# Patient Record
Sex: MALE | Race: WHITE | NOT HISPANIC OR LATINO | Employment: STUDENT | URBAN - METROPOLITAN AREA
[De-identification: names, ages, dates, MRNs, and addresses within clinical notes are randomized per-mention and may not be internally consistent; named-entity substitution may affect disease eponyms.]

---

## 2020-04-30 ENCOUNTER — HOSPITAL ENCOUNTER (EMERGENCY)
Facility: CLINIC | Age: 25
Discharge: HOME | End: 2020-04-30
Attending: FAMILY MEDICINE | Admitting: FAMILY MEDICINE
Payer: COMMERCIAL

## 2020-04-30 ENCOUNTER — APPOINTMENT (OUTPATIENT)
Dept: GENERAL RADIOLOGY | Facility: CLINIC | Age: 25
End: 2020-04-30
Attending: FAMILY MEDICINE
Payer: COMMERCIAL

## 2020-04-30 VITALS
TEMPERATURE: 98.7 F | OXYGEN SATURATION: 97 % | DIASTOLIC BLOOD PRESSURE: 85 MMHG | WEIGHT: 190 LBS | SYSTOLIC BLOOD PRESSURE: 132 MMHG | RESPIRATION RATE: 16 BRPM | BODY MASS INDEX: 25.73 KG/M2 | HEIGHT: 72 IN

## 2020-04-30 DIAGNOSIS — S93.411A SPRAIN OF CALCANEOFIBULAR LIGAMENT OF RIGHT ANKLE, INITIAL ENCOUNTER: ICD-10-CM

## 2020-04-30 PROCEDURE — 99283 EMERGENCY DEPT VISIT LOW MDM: CPT

## 2020-04-30 PROCEDURE — 73610 X-RAY EXAM OF ANKLE: CPT | Mod: RT

## 2020-04-30 PROCEDURE — 99282 EMERGENCY DEPT VISIT SF MDM: CPT | Mod: Z6 | Performed by: FAMILY MEDICINE

## 2020-04-30 PROCEDURE — 25000132 ZZH RX MED GY IP 250 OP 250 PS 637: Performed by: FAMILY MEDICINE

## 2020-04-30 RX ORDER — IBUPROFEN 200 MG
200 TABLET ORAL ONCE
Status: COMPLETED | OUTPATIENT
Start: 2020-04-30 | End: 2020-04-30

## 2020-04-30 RX ORDER — ACETAMINOPHEN 500 MG
1000 TABLET ORAL ONCE
Status: COMPLETED | OUTPATIENT
Start: 2020-04-30 | End: 2020-04-30

## 2020-04-30 RX ADMIN — ACETAMINOPHEN 1000 MG: 500 TABLET, FILM COATED ORAL at 10:33

## 2020-04-30 RX ADMIN — IBUPROFEN 200 MG: 200 TABLET, FILM COATED ORAL at 10:33

## 2020-04-30 ASSESSMENT — MIFFLIN-ST. JEOR: SCORE: 1889.83

## 2020-04-30 NOTE — ED AVS SNAPSHOT
[facility] Emergency Department  [address] [address]  Phone:  [phone]  Fax:  [phone]                                    Demario Rodrigez   MRN: [id number]    Department:  [facility] Emergency Department   Date of Visit:  4/30/2020           After Visit Summary Signature Page    I have received my discharge instructions, and my questions have been answered. I have discussed any challenges I see with this plan with the nurse or doctor.    ..........................................................................................................................................  Patient/Patient Representative Signature      ..........................................................................................................................................  Patient Representative Print Name and Relationship to Patient    ..................................................               ................................................  Date                                   Time    ..........................................................................................................................................  Reviewed by Signature/Title    ...................................................              ..............................................  Date                                               Time          22EPIC Rev 08/18

## 2020-04-30 NOTE — DISCHARGE INSTRUCTIONS
ICD-10-CM    1. Sprain of calcaneofibular ligament of right ankle, initial encounter  S93.411A Ankle Stabilizer Brace Regular (Gel Splint)    keep splinted with air splint, crutch walking.  return for numb, cold, immobile foot./ RICE-M.   slow rehab back to activity and then work proprioception as we discussed.     alternate ibu 600 mg every 6 hours and tylenol 1000 every 6 hours

## 2020-04-30 NOTE — ED PROVIDER NOTES
History     Chief Complaint   Patient presents with     Ankle Pain     fell playing basketball yesterday, right ankle pain; tried rest ice elevation without improvement     HPI  Demario Rodrigez is a 24 year old male who presents with ankle sprain last evening while playing basketball.  inversion injuryb  RT ankle.  with pain at lateral and medial ankle and unable to ambiulate.  has crutches.  No other injuries  prior ankle injury during boot camp  in 2013, and again in 2015.    Allergies:  No Known Allergies    Problem List:    There are no active problems to display for this patient.       Past Medical History:    No past medical history on file.    Past Surgical History:    No past surgical history on file.    Family History:    No family history on file.    Social History:  Marital Status:  Single [1]  Social History     Tobacco Use     Smoking status: Not on file   Substance Use Topics     Alcohol use: Not on file     Drug use: Not on file        Medications:    No current outpatient medications on file.        Review of Systems  ROS:  5 point ROS negative except as noted above in HPI, including Gen., Resp., CV, GI &  system review.    Physical Exam   BP: 132/85  Heart Rate: 82  Temp: 98.7  F (37.1  C)  Resp: 16  Height: 182.9 cm (6')  Weight: 86.2 kg (190 lb)  SpO2: 97 %      Physical Exam  Constitutional:       General: He is in acute distress.   Musculoskeletal:      Right knee: He exhibits normal range of motion, no swelling, no effusion and no bony tenderness.      Right ankle: He exhibits decreased range of motion and swelling. He exhibits no ecchymosis, no deformity, no laceration and normal pulse. Tenderness. Lateral malleolus, medial malleolus, AITFL and CF ligament tenderness found. No posterior TFL, no head of 5th metatarsal and no proximal fibula tenderness found.      Right foot: Normal capillary refill. No tenderness or bony tenderness.   Neurological:      Mental Status: He is alert.          ED Course        Procedures               Critical Care time:  none               Results for orders placed or performed during the hospital encounter of 04/30/20 (from the past 24 hour(s))   Ankle XR, G/E 3 views, right    Narrative    XR ANKLE RT G/E 3 VW 4/30/2020 10:27 AM     HISTORY: ankle msprain medial and lateral ankle swelling, tendernss      Impression    IMPRESSION: Lateral soft tissue swelling. Ligament injury is  suspected. No apparent fracture. The ankle mortise appears congruent.    FANY LEWIS MD       Medications - No data to display    Assessments & Plan (with Medical Decision Making)     MFDM: Demario Rodrigez is a 24 year old male presents with ankle sprain and lateral right ankle likely CF ligaments with significant swelling lateral ankle more than medial.  Decreased range of motion and difficulty ambulation, bony tenderness led to x-ray which demonstrates no obvious fracture.  Discussed the use of gel splint crutch walking gradual rehab and proprioception exercises with follow-up with primary doctor in a week and consider re-x-ray.  Discussed pain management with alternating Tylenol and Motrin.  Precautions given for return.  I have reviewed the nursing notes.    I have reviewed the findings, diagnosis, plan and need for follow up with the patient.       New Prescriptions    No medications on file       Final diagnoses:   Sprain of calcaneofibular ligament of right ankle, initial encounter - keep splinted with air splint, crutch walking.  return for numb, cold, immobile foot./ RICE-M.   slow rehab back to activity and then work proprioception as we discussed.       4/30/2020   [facility] EMERGENCY DEPARTMENT     Chiki Neal MD  04/30/20 [id number]

## 2021-06-13 ENCOUNTER — NURSE TRIAGE (OUTPATIENT)
Dept: NURSING | Facility: CLINIC | Age: 26
End: 2021-06-13

## 2021-06-13 ENCOUNTER — HOSPITAL ENCOUNTER (EMERGENCY)
Facility: CLINIC | Age: 26
Discharge: HOME | End: 2021-06-13
Attending: EMERGENCY MEDICINE | Admitting: EMERGENCY MEDICINE
Payer: COMMERCIAL

## 2021-06-13 VITALS
RESPIRATION RATE: 14 BRPM | BODY MASS INDEX: 27.09 KG/M2 | WEIGHT: 200 LBS | HEIGHT: 72 IN | OXYGEN SATURATION: 99 % | SYSTOLIC BLOOD PRESSURE: 108 MMHG | DIASTOLIC BLOOD PRESSURE: 74 MMHG | HEART RATE: 86 BPM | TEMPERATURE: 97.8 F

## 2021-06-13 DIAGNOSIS — T78.40XA ALLERGIC REACTION, INITIAL ENCOUNTER: ICD-10-CM

## 2021-06-13 PROBLEM — M75.40 IMPINGEMENT SYNDROME OF SHOULDER REGION: Status: ACTIVE | Noted: 2021-06-13

## 2021-06-13 PROBLEM — M54.50 LOW BACK PAIN: Status: ACTIVE | Noted: 2021-06-13

## 2021-06-13 PROBLEM — T14.90XA TRAUMATIC INJURY: Status: ACTIVE | Noted: 2021-06-13

## 2021-06-13 PROBLEM — T23.261A SECOND DEGREE BURN OF BACK OF RIGHT HAND: Status: ACTIVE | Noted: 2019-06-07

## 2021-06-13 PROBLEM — R51.9 HEADACHE: Status: ACTIVE | Noted: 2021-06-13

## 2021-06-13 PROCEDURE — 96361 HYDRATE IV INFUSION ADD-ON: CPT | Performed by: EMERGENCY MEDICINE

## 2021-06-13 PROCEDURE — 96365 THER/PROPH/DIAG IV INF INIT: CPT | Performed by: EMERGENCY MEDICINE

## 2021-06-13 PROCEDURE — 96375 TX/PRO/DX INJ NEW DRUG ADDON: CPT | Performed by: EMERGENCY MEDICINE

## 2021-06-13 PROCEDURE — 250N000011 HC RX IP 250 OP 636: Performed by: EMERGENCY MEDICINE

## 2021-06-13 PROCEDURE — 96366 THER/PROPH/DIAG IV INF ADDON: CPT | Performed by: EMERGENCY MEDICINE

## 2021-06-13 PROCEDURE — 258N000003 HC RX IP 258 OP 636: Performed by: EMERGENCY MEDICINE

## 2021-06-13 PROCEDURE — 99284 EMERGENCY DEPT VISIT MOD MDM: CPT | Mod: 25 | Performed by: EMERGENCY MEDICINE

## 2021-06-13 PROCEDURE — 99284 EMERGENCY DEPT VISIT MOD MDM: CPT | Performed by: EMERGENCY MEDICINE

## 2021-06-13 RX ORDER — ONDANSETRON 2 MG/ML
4 INJECTION INTRAMUSCULAR; INTRAVENOUS EVERY 30 MIN PRN
Status: DISCONTINUED | OUTPATIENT
Start: 2021-06-13 | End: 2021-06-13 | Stop reason: HOSPADM

## 2021-06-13 RX ORDER — METHYLPREDNISOLONE SODIUM SUCCINATE 125 MG/2ML
125 INJECTION, POWDER, LYOPHILIZED, FOR SOLUTION INTRAMUSCULAR; INTRAVENOUS ONCE
Status: COMPLETED | OUTPATIENT
Start: 2021-06-13 | End: 2021-06-13

## 2021-06-13 RX ORDER — EPINEPHRINE 0.3 MG/.3ML
0.3 INJECTION SUBCUTANEOUS
Qty: 1 EACH | Refills: 0 | Status: SHIPPED | OUTPATIENT
Start: 2021-06-13

## 2021-06-13 RX ADMIN — ONDANSETRON 4 MG: 2 INJECTION INTRAMUSCULAR; INTRAVENOUS at 11:35

## 2021-06-13 RX ADMIN — FAMOTIDINE 20 MG: 20 INJECTION, SOLUTION INTRAVENOUS at 13:16

## 2021-06-13 RX ADMIN — SODIUM CHLORIDE 1000 ML: 9 INJECTION, SOLUTION INTRAVENOUS at 11:35

## 2021-06-13 RX ADMIN — METHYLPREDNISOLONE SODIUM SUCCINATE 125 MG: 125 INJECTION, POWDER, FOR SOLUTION INTRAMUSCULAR; INTRAVENOUS at 13:15

## 2021-06-13 ASSESSMENT — ENCOUNTER SYMPTOMS
DIARRHEA: 1
CHILLS: 0
HEADACHES: 0
ABDOMINAL PAIN: 1
WEAKNESS: 1
FACIAL SWELLING: 0
VOMITING: 1
NERVOUS/ANXIOUS: 0
FEVER: 0
DIFFICULTY URINATING: 0
SORE THROAT: 0
VOICE CHANGE: 0
TROUBLE SWALLOWING: 0
LIGHT-HEADEDNESS: 1
MYALGIAS: 0
NAUSEA: 1
SHORTNESS OF BREATH: 0

## 2021-06-13 ASSESSMENT — MIFFLIN-ST. JEOR: SCORE: 1925.19

## 2021-06-13 NOTE — ED PROVIDER NOTES
History     Chief Complaint   Patient presents with     Allergic Reaction     Pt reports woke up this morning feeling fine, started to get itchy, took a shower, noted hives on legs and testicles, pt reports after his shower he went upstairs, reports he was so weak he fell going up the stairs. Pt denies CP, sob at this time. Pt reorts he did have an episode of vomting X2 with some diarrhea.      Abdominal Pain     Pt reports mid to lower abdominal pain, rating pain 4/10     HPI  Demario Rodrigez is a 26 year old male with h no reported past medical history who was brought in by ambulance with concern for allergic reaction.  He was in his usual state of health this morning and was outside playing with his children.  He went inside to take a shower and then noticed he had hives on his medial thighs bilaterally.  Area was red, raised and itchy.  He also noticed these in his axilla and a few on his legs.  He went to show his wife.  He started to feel weak.  He spoke to a nurse line and was vies to take Benadryl.  He tried to walk upstairs and just felt too weak and then went down to the floor.  His wife called 911.  He had 4 episodes of vomiting prior to arrival in the emergency department.  He was given 50 mg of diphenhydramine prehospital.  While in this department he has had multiple episodes of loose watery stools.  Has not had any difficulty breathing, swelling in mouth of throat, or wheezing.  No known allergies.  Not currently taking any medications.  No known exposures.    The patient's PMHx, Surgical Hx, Allergies, and Medications were all reviewed with the patient.    Allergies:  No Known Allergies    Problem List:    Patient Active Problem List    Diagnosis Date Noted     Traumatic injury 06/13/2021     Priority: Medium     Low back pain 06/13/2021     Priority: Medium     Impingement syndrome of shoulder region 06/13/2021     Priority: Medium     Aug 02, 2018 Entered By: ANTOINETTE WARREN Comment: right  side       Headache 06/13/2021     Priority: Medium     Second degree burn of back of right hand 06/07/2019     Priority: Medium        Past Medical History:    No past medical history on file.    Past Surgical History:    No past surgical history on file.    Family History:    No family history on file.    Social History:  Marital Status:   [2]  Social History     Tobacco Use     Smoking status: Not on file   Substance Use Topics     Alcohol use: Not on file     Drug use: Not on file        Medications:    EPINEPHrine (ANY BX GENERIC EQUIV) 0.3 MG/0.3ML injection 2-pack          Review of Systems   Constitutional: Negative for chills and fever.   HENT: Negative for facial swelling, mouth sores, sore throat, trouble swallowing and voice change.    Eyes: Negative for visual disturbance.   Respiratory: Negative for shortness of breath.    Cardiovascular: Negative for chest pain.   Gastrointestinal: Positive for abdominal pain, diarrhea, nausea and vomiting.   Genitourinary: Negative for difficulty urinating.   Musculoskeletal: Negative for myalgias.   Skin: Positive for rash (urticaria).   Neurological: Positive for weakness and light-headedness. Negative for headaches.   Psychiatric/Behavioral: The patient is not nervous/anxious.        Physical Exam   BP: (!) 116/90  Pulse: 59  Temp: 97.8  F (36.6  C)  Resp: 16  Height: 182.9 cm (6')  Weight: 90.7 kg (200 lb)  SpO2: 98 %    Physical Exam  GEN: Awake, alert, and cooperative.  Appears distressed but nontoxic.  HENT: MMM. External ears and nose normal bilaterally.  No perioral edema.  No lingual edema.  No edema posterior oropharynx.  No lesions appreciated in mouth.  EYES: EOM intact. Conjunctiva clear. No discharge.   NECK: Symmetric, freely mobile.  Nontender.  CV : Regular rate and rhythm.  Extremities warm and well perfused.  PULM: Normal effort. No wheezes, rales, or rhonchi bilaterally.  Good air movement bilaterally.  No prolongation of expiratory  phase.  ABD: Soft, mild generalized tenderness, non-distended. No rebound or guarding.   NEURO: Normal speech.  No change in voice following commands. CN II-XII grossly intact. Answering questions and interacting appropriately.   EXT: No gross deformity. Warm and well perfused.  INT: Scattered erythematous lesions.  Some raised some flat which are pruritic in perineum.        ED Course        Procedures           Critical Care time:  none               No results found for this or any previous visit (from the past 24 hour(s)).    Medications   ondansetron (ZOFRAN) injection 4 mg (4 mg Intravenous Given 6/13/21 1135)   famotidine (PEPCID) infusion 20 mg (0 mg Intravenous Stopped 6/13/21 1447)   0.9% sodium chloride BOLUS (0 mLs Intravenous Stopped 6/13/21 1316)   methylPREDNISolone sodium succinate (solu-MEDROL) injection 125 mg (125 mg Intravenous Given 6/13/21 1315)       Assessments & Plan (with Medical Decision Making)   26 year old male with no reported past medical history was brought in by ambulance with urticarial rash associated with abdominal cramping, nausea, vomiting, diarrhea, and weakness.  He was [id number] of IV fluids and 50 mg of diphenhydramine prehospital.     On arrival to emergency department heart rate was 59, SPO2 98% on room air, respiratory rate 16, afebrile, and blood pressure 116/90.  On my initial evaluation, he did not appear acutely distressed.  Was breathing comfortably with no accessory muscle usage.  No wheezing or prolongation of expiratory phase.  No edema of posterior oropharynx.  No stridor.  He did have signs of resolving urticaria in his proximal arms and legs which were fairly symmetric.  He had a few loose stools while the emergency department and his presentation is consistent with anaphylaxis.  It had been several hours since onset of his symptoms prior to my evaluation in the emergency department.  Given no respiratory involvement, hypotension, and improving GI symptoms I did  not feel that administration of epinephrine needed at this time.  He received diphenhydramine prehospital and he was given famotidine and methylprednisolone while in the emergency department.  He was observed for several hours and continued to improve symptomatically.    No clear precipitant of today's event.  States only thing he ate this morning was with watermelon which she is eaten before without difficulty.  He was inquiring about following up in clinic to determine what he may be allergic to.  I suggest that he follows up with primary care provider and may need to be evaluated by an allergist.  Meanwhile he was given a prescription for an EpiPen and instructed on when and how to use.  [facility] with his normal providers there.  Given his continued improvement of symptoms and no airway involvement, I feel he is safe for discharge.  Strict ED return precautions discussed.  He expressed agreement understanding of plan and discharged in improved condition.    I have reviewed the nursing notes.         Discharge Medication List as of 6/13/2021  2:55 PM      START taking these medications    Details   EPINEPHrine (ANY BX GENERIC EQUIV) 0.3 MG/0.3ML injection 2-pack Inject 0.3 mLs (0.3 mg) into the muscle once as needed for anaphylaxis, Disp-1 each, R-0, E-Prescribe             Final diagnoses:   Allergic reaction, initial encounter     Arsenio Ron MD    6/13/2021   [facility] EMERGENCY DEPT    Disclaimer: This note consists of words and symbols derived from keyboarding and dictation using voice recognition software.  As a result, there may be errors that have gone undetected.  Please consider this when interpreting information found in this note.             Arsenio Ron MD  06/17/21 [id number]

## 2021-06-13 NOTE — TELEPHONE ENCOUNTER
Hives noticed noticed  Itchy  No out of the ordinary food    Stated he is getting lightheaded  Went to get Benadryl - fell, knocked over the gate, fell down two steps    Wife came on phone and said she was going to call 911     I called back:  Demario was on the floor  Breathing ok  Responsive  Stomach hurts  Began to vomit  I stayed on the phone with her.  First responders arrived  I waited on the phone for several minutes as I'd promised Nicole, wife.  Nicole didn't come back on the phone.  I could hear responder so hung up.    COVID 19 Nurse Triage Plan/Patient Instructions    Please be aware that novel coronavirus (COVID-19) may be circulating in the community. If you develop symptoms such as fever, cough, or SOB or if you have concerns about the presence of another infection including coronavirus (COVID-19), please contact your health care provider or visit [email or web address].     Disposition/Instructions    Call to EMS/911 recommended. Follow protocol based instructions.     Bring Your Own Device:  Please also bring your smart device(s) (smart phones, tablets, laptops) and their charging cables for your personal use and to communicate with your care team during your visit.    Thank you for taking steps to prevent the spread of this virus.  o Limit your contact with others.  o Wear a simple mask to cover your cough.  o Wash your hands well and often.    Resources    [facility]: About COVID-19: [email or web address]    CDC: What to Do If You're Sick: www.cdc.gov/coronavirus/2019-ncov/about/steps-when-sick.html    CDC: Ending Home Isolation: www.cdc.gov/coronavirus/2019-ncov/hcp/disposition-in-home-patients.html     CDC: Caring for Someone: www.cdc.gov/coronavirus/2019-ncov/if-you-are-sick/care-for-someone.html     [facility]: Interim Guidance for Hospital Discharge to Home: www.[email or web address]/diseases/coronavirus/hcp/hospdischarge.pdf    [facility] clinical trials (COVID-19  research studies): [email or web address]-clinical-trials     Below are the COVID-19 hotlines at the [place] Department of Health ([facility]). Interpreters are available.   o For health questions: Call 852-022-6352 or 1-568.323.6113 (7 a.m. to 7 p.m.)  o For questions about schools and childcare: Call 234-142-7782 or 1-170.264.1016 (7 a.m. to 7 p.m.)     Reason for Disposition    Sounds like a life-threatening emergency to the triager    Protocols used: MARIANNE GIBBONS RN [facility]

## 2022-02-27 ENCOUNTER — HOSPITAL ENCOUNTER (EMERGENCY)
Facility: CLINIC | Age: 27
Discharge: HOME | End: 2022-02-27
Attending: EMERGENCY MEDICINE | Admitting: EMERGENCY MEDICINE
Payer: COMMERCIAL

## 2022-02-27 VITALS
TEMPERATURE: 98.2 F | DIASTOLIC BLOOD PRESSURE: 89 MMHG | SYSTOLIC BLOOD PRESSURE: 146 MMHG | OXYGEN SATURATION: 97 % | WEIGHT: 205 LBS | RESPIRATION RATE: 20 BRPM | BODY MASS INDEX: 27.8 KG/M2 | HEART RATE: 108 BPM

## 2022-02-27 DIAGNOSIS — R06.02 SHORTNESS OF BREATH: ICD-10-CM

## 2022-02-27 LAB
FLUAV RNA SPEC QL NAA+PROBE: NEGATIVE
FLUBV RNA RESP QL NAA+PROBE: NEGATIVE
SARS-COV-2 RNA RESP QL NAA+PROBE: NEGATIVE

## 2022-02-27 PROCEDURE — C9803 HOPD COVID-19 SPEC COLLECT: HCPCS

## 2022-02-27 PROCEDURE — 99283 EMERGENCY DEPT VISIT LOW MDM: CPT

## 2022-02-27 PROCEDURE — 87636 SARSCOV2 & INF A&B AMP PRB: CPT | Performed by: EMERGENCY MEDICINE

## 2022-02-27 PROCEDURE — 99282 EMERGENCY DEPT VISIT SF MDM: CPT | Performed by: EMERGENCY MEDICINE

## 2022-02-27 NOTE — ED PROVIDER NOTES
History     Chief Complaint   Patient presents with     Allergic Reaction     Shortness of Breath     HPI  Demaroi Rodrigez is a 26 year old male who presents for shortness of breath and concern for allergic reaction.  The patient has been sick with a cold recently as has the rest of his family.  This morning he got up to check on one of his children and felt short of breath and warm.  He was worried he was having an allergic reaction and symptoms did not get better.  Seem to get worse and so he took 2 tablets of diphenhydramine and came here for more evaluation.  He says he feels much better now.  No fevers.  He has had some runny nose and cough but no short of breath now.  No chest pain, abdominal pain, nausea, vomiting, diarrhea, bloody stools, or rash.    Allergies:  No Known Allergies    Problem List:    Patient Active Problem List    Diagnosis Date Noted     Traumatic injury 06/13/2021     Priority: Medium     Low back pain 06/13/2021     Priority: Medium     Impingement syndrome of shoulder region 06/13/2021     Priority: Medium     Aug 02, 2018 Entered By: ANTOINETTE WARREN Comment: right side       Headache 06/13/2021     Priority: Medium     Second degree burn of back of right hand 06/07/2019     Priority: Medium        Past Medical History:    No past medical history on file.    Past Surgical History:    No past surgical history on file.    Family History:    No family history on file.    Social History:  Marital Status:   [2]  Social History     Tobacco Use     Smoking status: Not on file     Smokeless tobacco: Not on file   Substance Use Topics     Alcohol use: Not on file     Drug use: Not on file        Medications:    EPINEPHrine (ANY BX GENERIC EQUIV) 0.3 MG/0.3ML injection 2-pack          Review of Systems  Pertinent positives and negatives listed in the HPI, all other systems reviewed and are negative.    Physical Exam   BP: (!) 146/89  Pulse: 108  Temp: 98.2  F (36.8  C)  Resp:  20  Weight: 93 kg (205 lb)  SpO2: 97 %      Physical Exam  Vitals and nursing note reviewed.   Constitutional:       General: He is not in acute distress.     Appearance: He is well-developed. He is not diaphoretic.   HENT:      Head: Normocephalic and atraumatic.      Right Ear: Tympanic membrane and external ear normal.      Left Ear: Tympanic membrane and external ear normal.      Nose: Nose normal.      Mouth/Throat:      Mouth: Mucous membranes are moist.      Pharynx: Oropharynx is clear. No oropharyngeal exudate or posterior oropharyngeal erythema.   Eyes:      General: No scleral icterus.     Conjunctiva/sclera: Conjunctivae normal.   Cardiovascular:      Rate and Rhythm: Normal rate and regular rhythm.      Heart sounds: No murmur heard.  Pulmonary:      Effort: Pulmonary effort is normal. No respiratory distress.      Breath sounds: No stridor.   Abdominal:      General: There is no distension.      Palpations: Abdomen is soft.      Tenderness: There is no abdominal tenderness. There is no guarding or rebound.   Musculoskeletal:      Cervical back: Normal range of motion.      Right lower leg: No edema.      Left lower leg: No edema.   Lymphadenopathy:      Cervical: No cervical adenopathy.   Skin:     General: Skin is warm and dry.   Neurological:      Mental Status: He is alert and oriented to person, place, and time.   Psychiatric:         Behavior: Behavior normal.         ED Course                 Procedures              Critical Care time:  none               No results found for this or any previous visit (from the past 24 hour(s)).    Medications - No data to display    Assessments & Plan (with Medical Decision Making)   26-year-old male presents for shortness of breath and concern for an allergic reaction that has gotten better after taking diphenhydramine prior to coming to the emergency department.  Blood pressure is 146/89, temperature is 36.8  C, heart rate 108, SPO2 is 97% on room air.  Lungs  are clear to auscultation throughout without wheezing or rales.  No signs of pneumonia or proximal spasm.  No signs of anaphylaxis or allergic reaction on exam.  He is breathing comfortably.  Abdominal exam is benign and not concerning for an acute surgical process.  No rash.  Nasal swab for Covid and influenza pending at time of discharge.  The patient is observed in the emergency department for 45 minutes and at this time is safe to discharge with reassurance and instructions to return if he has worsening of his symptoms or other concerns, otherwise follow-up in clinic.  The patient is in agreement with this plan.    I have reviewed the nursing notes.    I have reviewed the findings, diagnosis, plan and need for follow up with the patient.       New Prescriptions    No medications on file       Final diagnoses:   Shortness of breath       2/27/2022   [facility] EMERGENCY DEPT     Eliceo Velez MD  02/27/22 0607

## 2022-02-27 NOTE — DISCHARGE INSTRUCTIONS
I am glad you are feeling better now.  No signs of allergic reaction or anaphylaxis at this time.  No indication for specific therapy or treatment of this at this time.  Return if you are feeling worse or having more shortness of breath, difficulty breathing, repeated vomiting, or other concerns.  Otherwise continue using acetaminophen and ibuprofen as needed and follow-up in clinic if not feeling better over the next 3 to 4 days.

## 2022-07-24 ENCOUNTER — LAB (OUTPATIENT)
Dept: FAMILY MEDICINE | Facility: CLINIC | Age: 27
End: 2022-07-24
Payer: COMMERCIAL

## 2022-07-24 DIAGNOSIS — Z20.822 SUSPECTED COVID-19 VIRUS INFECTION: ICD-10-CM

## 2022-07-24 PROCEDURE — 99207 PR NO CHARGE LOS: CPT

## 2022-07-24 PROCEDURE — U0003 INFECTIOUS AGENT DETECTION BY NUCLEIC ACID (DNA OR RNA); SEVERE ACUTE RESPIRATORY SYNDROME CORONAVIRUS 2 (SARS-COV-2) (CORONAVIRUS DISEASE [COVID-19]), AMPLIFIED PROBE TECHNIQUE, MAKING USE OF HIGH THROUGHPUT TECHNOLOGIES AS DESCRIBED BY CMS-2020-01-R: HCPCS

## 2022-07-24 PROCEDURE — U0005 INFEC AGEN DETEC AMPLI PROBE: HCPCS

## 2022-07-25 LAB — SARS-COV-2 RNA RESP QL NAA+PROBE: POSITIVE

## 2022-07-30 ENCOUNTER — HEALTH MAINTENANCE LETTER (OUTPATIENT)
Age: 27
End: 2022-07-30

## 2022-10-10 ENCOUNTER — HEALTH MAINTENANCE LETTER (OUTPATIENT)
Age: 27
End: 2022-10-10

## 2023-02-12 ENCOUNTER — OFFICE VISIT (OUTPATIENT)
Dept: URGENT CARE | Facility: URGENT CARE | Age: 28
End: 2023-02-12
Payer: COMMERCIAL

## 2023-02-12 VITALS
BODY MASS INDEX: 29.84 KG/M2 | HEART RATE: 88 BPM | WEIGHT: 220 LBS | DIASTOLIC BLOOD PRESSURE: 79 MMHG | OXYGEN SATURATION: 98 % | TEMPERATURE: 97 F | SYSTOLIC BLOOD PRESSURE: 119 MMHG

## 2023-02-12 DIAGNOSIS — R07.0 THROAT PAIN: Primary | ICD-10-CM

## 2023-02-12 LAB
DEPRECATED S PYO AG THROAT QL EIA: NEGATIVE
GROUP A STREP BY PCR: NOT DETECTED

## 2023-02-12 PROCEDURE — 87651 STREP A DNA AMP PROBE: CPT | Performed by: FAMILY MEDICINE

## 2023-02-12 PROCEDURE — 99203 OFFICE O/P NEW LOW 30 MIN: CPT | Performed by: FAMILY MEDICINE

## 2023-02-12 NOTE — PROGRESS NOTES
Assessment & Plan     Throat pain  Strep neg, viral illness  - Streptococcus A Rapid Screen w/Reflex to PCR - Clinic Collect  - Group A Streptococcus PCR Throat Swab             No follow-ups on file.    Nitin Day MD  [facility]    Contreras Hanks is a 27 year old male who presents to clinic today for the following health issues:  Chief Complaint   Patient presents with     Pharyngitis     Family has strep, throat pain     HPI    ST.  Family with strep.  Coughing.  Started Friday.  No ear pain.  No fever.  No medicine taken.        Review of Systems        Objective    /79   Pulse 88   Temp 97  F (36.1  C) (Tympanic)   Wt 99.8 kg (220 lb)   SpO2 98%   BMI 29.84 kg/m    Physical Exam  Vitals and nursing note reviewed.   Constitutional:       Appearance: Normal appearance.   HENT:      Right Ear: Tympanic membrane normal.      Left Ear: Tympanic membrane normal.      Mouth/Throat:      Mouth: Mucous membranes are moist.   Eyes:      Pupils: Pupils are equal, round, and reactive to light.   Cardiovascular:      Rate and Rhythm: Normal rate and regular rhythm.      Pulses: Normal pulses.      Heart sounds: Normal heart sounds.   Pulmonary:      Effort: Pulmonary effort is normal.      Breath sounds: Normal breath sounds.   Musculoskeletal:      Cervical back: Neck supple.   Lymphadenopathy:      Cervical: No cervical adenopathy.   Neurological:      Mental Status: He is alert.

## 2023-08-20 ENCOUNTER — HEALTH MAINTENANCE LETTER (OUTPATIENT)
Age: 28
End: 2023-08-20

## 2024-10-13 ENCOUNTER — HEALTH MAINTENANCE LETTER (OUTPATIENT)
Age: 29
End: 2024-10-13

## 2025-02-17 ENCOUNTER — APPOINTMENT (OUTPATIENT)
Dept: OCCUPATIONAL MEDICINE | Facility: CLINIC | Age: 30
End: 2025-02-17

## 2025-02-17 PROCEDURE — 99499 UNLISTED E&M SERVICE: CPT | Performed by: NURSE PRACTITIONER

## 2025-03-31 ENCOUNTER — APPOINTMENT (OUTPATIENT)
Dept: OCCUPATIONAL MEDICINE | Facility: CLINIC | Age: 30
End: 2025-03-31

## 2025-03-31 PROCEDURE — 97799 UNLISTED PHYSCL MED/REHAB PX: CPT | Performed by: NURSE PRACTITIONER
